# Patient Record
Sex: FEMALE | NOT HISPANIC OR LATINO | URBAN - METROPOLITAN AREA
[De-identification: names, ages, dates, MRNs, and addresses within clinical notes are randomized per-mention and may not be internally consistent; named-entity substitution may affect disease eponyms.]

---

## 2017-03-10 ENCOUNTER — APPOINTMENT (RX ONLY)
Dept: URBAN - METROPOLITAN AREA CLINIC 355 | Facility: CLINIC | Age: 32
Setting detail: DERMATOLOGY
End: 2017-03-10

## 2017-03-10 DIAGNOSIS — L70.0 ACNE VULGARIS: ICD-10-CM

## 2017-03-10 DIAGNOSIS — L81.4 OTHER MELANIN HYPERPIGMENTATION: ICD-10-CM

## 2017-03-10 PROCEDURE — ? TREATMENT REGIMEN

## 2017-03-10 PROCEDURE — 99202 OFFICE O/P NEW SF 15 MIN: CPT

## 2017-03-10 PROCEDURE — ? PRESCRIPTION

## 2017-03-10 PROCEDURE — ? COUNSELING

## 2017-03-10 RX ORDER — AZELAIC ACID 0.15 G/G
AEROSOL, FOAM TOPICAL
Qty: 1 | Refills: 2 | Status: ERX | COMMUNITY
Start: 2017-03-10

## 2017-03-10 RX ORDER — DOXYCYCLINE HYCLATE 150 MG/1
TABLET, COATED ORAL
Qty: 30 | Refills: 2 | Status: ERX | COMMUNITY
Start: 2017-03-10

## 2017-03-10 RX ORDER — ADAPALENE AND BENZOYL PEROXIDE 3; 25 MG/G; MG/G
GEL TOPICAL
Qty: 1 | Refills: 2 | Status: ERX | COMMUNITY
Start: 2017-03-10

## 2017-03-10 RX ADMIN — AZELAIC ACID: 0.15 AEROSOL, FOAM TOPICAL at 17:33

## 2017-03-10 RX ADMIN — ADAPALENE AND BENZOYL PEROXIDE: 3; 25 GEL TOPICAL at 17:33

## 2017-03-10 RX ADMIN — DOXYCYCLINE HYCLATE: 150 TABLET, COATED ORAL at 17:33

## 2017-03-10 ASSESSMENT — LOCATION SIMPLE DESCRIPTION DERM
LOCATION SIMPLE: RIGHT CHEEK
LOCATION SIMPLE: LEFT CHEEK

## 2017-03-10 ASSESSMENT — LOCATION ZONE DERM: LOCATION ZONE: FACE

## 2017-03-10 ASSESSMENT — LOCATION DETAILED DESCRIPTION DERM
LOCATION DETAILED: RIGHT CENTRAL MALAR CHEEK
LOCATION DETAILED: LEFT CENTRAL MALAR CHEEK
LOCATION DETAILED: RIGHT LATERAL MALAR CHEEK

## 2017-03-10 NOTE — PROCEDURE: TREATMENT REGIMEN
Detail Level: Zone
Initiate Treatment: AM\\nWash face with a gentle wash\\napply a thin layer of finacea foam to face \\napply a moisturize with SPF\\n\\nPM\\nWash face with a gentle cleanser\\napply a pea size amount of epiduo forte to face every other night then increase to nightly as tolerated\\napply a moisturizer to prevent flaking and dryness\\n\\nTake 1 acticlate 150mg by mouth daily with food

## 2017-03-27 RX ORDER — AZELAIC ACID 0.15 G/G
AEROSOL, FOAM TOPICAL
Qty: 1 | Refills: 2 | Status: ERX

## 2017-03-27 RX ORDER — DOXYCYCLINE HYCLATE 150 MG/1
TABLET, COATED ORAL
Qty: 30 | Refills: 2 | Status: ERX

## 2017-03-27 RX ORDER — ADAPALENE AND BENZOYL PEROXIDE 3; 25 MG/G; MG/G
GEL TOPICAL
Qty: 1 | Refills: 2 | Status: ERX